# Patient Record
Sex: FEMALE | Race: WHITE | Employment: UNEMPLOYED | URBAN - METROPOLITAN AREA
[De-identification: names, ages, dates, MRNs, and addresses within clinical notes are randomized per-mention and may not be internally consistent; named-entity substitution may affect disease eponyms.]

---
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
## 2023-11-16 ENCOUNTER — OFFICE VISIT (OUTPATIENT)
Dept: URGENT CARE | Age: 23
End: 2023-11-16

## 2023-11-16 VITALS
HEART RATE: 82 BPM | HEIGHT: 62 IN | TEMPERATURE: 99.6 F | WEIGHT: 151 LBS | SYSTOLIC BLOOD PRESSURE: 118 MMHG | BODY MASS INDEX: 27.79 KG/M2 | OXYGEN SATURATION: 96 % | DIASTOLIC BLOOD PRESSURE: 78 MMHG

## 2023-11-16 DIAGNOSIS — R50.9 FEVER AND CHILLS: ICD-10-CM

## 2023-11-16 DIAGNOSIS — J06.9 UPPER RESPIRATORY TRACT INFECTION, UNSPECIFIED TYPE: Primary | ICD-10-CM

## 2023-11-16 DIAGNOSIS — R51.9 HEADACHE AROUND THE EYES: ICD-10-CM

## 2023-11-16 LAB
Lab: NORMAL
QC PASS/FAIL: NORMAL
SARS-COV-2 RDRP RESP QL NAA+PROBE: NEGATIVE

## 2023-11-16 RX ORDER — IBUPROFEN 800 MG/1
800 TABLET ORAL 2 TIMES DAILY PRN
Qty: 60 TABLET | Refills: 0 | Status: SHIPPED | OUTPATIENT
Start: 2023-11-16 | End: 2023-12-16

## 2023-11-16 ASSESSMENT — ENCOUNTER SYMPTOMS
SINUS PRESSURE: 1
CONSTIPATION: 0
DIARRHEA: 0
ABDOMINAL PAIN: 0
WHEEZING: 0
COUGH: 1
SHORTNESS OF BREATH: 0
VOMITING: 0
CHEST TIGHTNESS: 0
NAUSEA: 0
STRIDOR: 0
SORE THROAT: 0

## 2024-04-15 ENCOUNTER — OFFICE VISIT (OUTPATIENT)
Dept: URGENT CARE | Age: 24
End: 2024-04-15

## 2024-04-15 VITALS
OXYGEN SATURATION: 99 % | HEIGHT: 62 IN | TEMPERATURE: 98.7 F | WEIGHT: 162 LBS | DIASTOLIC BLOOD PRESSURE: 80 MMHG | BODY MASS INDEX: 29.81 KG/M2 | SYSTOLIC BLOOD PRESSURE: 127 MMHG | HEART RATE: 77 BPM

## 2024-04-15 DIAGNOSIS — Z32.01 PREGNANCY CONFIRMED BY POSITIVE URINE TEST: ICD-10-CM

## 2024-04-15 DIAGNOSIS — N91.1 SECONDARY AMENORRHEA: Primary | ICD-10-CM

## 2024-04-15 LAB
CONTROL: POSITIVE
PREGNANCY TEST URINE, POC: POSITIVE

## 2024-04-15 ASSESSMENT — ENCOUNTER SYMPTOMS
ABDOMINAL PAIN: 0
SORE THROAT: 0
COUGH: 0
VOMITING: 0
DIARRHEA: 0
NAUSEA: 0

## 2024-04-15 NOTE — PROGRESS NOTES
Cass Bertrand (:  2000) is a 23 y.o. female,Established patient, here for evaluation of the following chief complaint(s):  Pregnancy Test (Pt has missed period and home pregnancy test is positive)      ASSESSMENT/PLAN:    ICD-10-CM    1. Secondary amenorrhea  N91.1 POCT urine pregnancy      2. Pregnancy confirmed by positive urine test  Z32.01         Results for POC orders placed in visit on 04/15/24   POCT urine pregnancy   Result Value Ref Range    Preg Test, Ur POSITIVE (A)     Control POSITIVE       Schedule appointment with OB/gyn for follow up visit     Go to ER if spikes fever, if abdominal/back pain/cramping  Vaginal bleeding or has new symptoms or concerns    SUBJECTIVE/OBJECTIVE:   Few days late with period...The home pregnancy test (+)  no other complaints / no medical issues      History provided by:  Patient   used: No        Vitals:    04/15/24 1121   BP: 127/80   Pulse: 77   Temp: 98.7 °F (37.1 °C)   TempSrc: Oral   SpO2: 99%   Weight: 73.5 kg (162 lb)   Height: 1.575 m (5' 2\")       Review of Systems   Constitutional:  Negative for fever.   HENT:  Negative for sore throat.    Respiratory:  Negative for cough.    Cardiovascular:  Negative for chest pain.   Gastrointestinal:  Negative for abdominal pain, diarrhea, nausea and vomiting.   Genitourinary:  Negative for difficulty urinating, vaginal bleeding and vaginal discharge.   Musculoskeletal:  Negative for myalgias.   Neurological:  Negative for headaches.       Physical Exam    Physical   interested only in pregnancy test  Vitals signs: reviewed  Constitutional:  appearance: well nourished .. No distress                                 .  An electronic signature was used to authenticate this note.    --Aman Limon MD

## 2024-04-21 ENCOUNTER — APPOINTMENT (OUTPATIENT)
Dept: ULTRASOUND IMAGING | Age: 24
End: 2024-04-21
Payer: COMMERCIAL

## 2024-04-21 ENCOUNTER — HOSPITAL ENCOUNTER (EMERGENCY)
Age: 24
Discharge: HOME | End: 2024-04-21
Attending: STUDENT IN AN ORGANIZED HEALTH CARE EDUCATION/TRAINING PROGRAM
Payer: COMMERCIAL

## 2024-04-21 VITALS
DIASTOLIC BLOOD PRESSURE: 81 MMHG | HEIGHT: 62 IN | BODY MASS INDEX: 30.36 KG/M2 | HEART RATE: 71 BPM | OXYGEN SATURATION: 100 % | TEMPERATURE: 97.9 F | WEIGHT: 165 LBS | RESPIRATION RATE: 16 BRPM | SYSTOLIC BLOOD PRESSURE: 131 MMHG

## 2024-04-21 DIAGNOSIS — O46.90 VAGINAL BLEEDING IN PREGNANCY: Primary | ICD-10-CM

## 2024-04-21 DIAGNOSIS — Z67.90 BLOOD TYPE, RH POSITIVE: ICD-10-CM

## 2024-04-21 LAB
ABO + RH BLD: NORMAL
ANION GAP SERPL CALCULATED.3IONS-SCNC: 14 MMOL/L (ref 3–16)
B-HCG SERPL EIA 3RD IS-ACNC: 19.3 MIU/ML
BASOPHILS # BLD: 0 K/UL (ref 0–0.2)
BASOPHILS NFR BLD: 0.3 %
BILIRUB UR QL STRIP.AUTO: NEGATIVE
BUN SERPL-MCNC: 14 MG/DL (ref 7–20)
CALCIUM SERPL-MCNC: 9 MG/DL (ref 8.3–10.6)
CHLORIDE SERPL-SCNC: 98 MMOL/L (ref 99–110)
CLARITY UR: CLEAR
CO2 SERPL-SCNC: 22 MMOL/L (ref 21–32)
COLOR UR: ABNORMAL
CREAT SERPL-MCNC: 0.7 MG/DL (ref 0.6–1.1)
DEPRECATED RDW RBC AUTO: 16.4 % (ref 12.4–15.4)
EOSINOPHIL # BLD: 0.1 K/UL (ref 0–0.6)
EOSINOPHIL NFR BLD: 0.6 %
EPI CELLS #/AREA URNS HPF: NORMAL /HPF (ref 0–5)
GFR SERPLBLD CREATININE-BSD FMLA CKD-EPI: >90 ML/MIN/{1.73_M2}
GLUCOSE SERPL-MCNC: 88 MG/DL (ref 70–99)
GLUCOSE UR STRIP.AUTO-MCNC: NEGATIVE MG/DL
HCG UR QL: NEGATIVE
HCT VFR BLD AUTO: 38.5 % (ref 36–48)
HGB BLD-MCNC: 12.3 G/DL (ref 12–16)
HGB UR QL STRIP.AUTO: ABNORMAL
KETONES UR STRIP.AUTO-MCNC: NEGATIVE MG/DL
LEUKOCYTE ESTERASE UR QL STRIP.AUTO: NEGATIVE
LYMPHOCYTES # BLD: 2.2 K/UL (ref 1–5.1)
LYMPHOCYTES NFR BLD: 22.9 %
MCH RBC QN AUTO: 25.4 PG (ref 26–34)
MCHC RBC AUTO-ENTMCNC: 32 G/DL (ref 31–36)
MCV RBC AUTO: 79.3 FL (ref 80–100)
MONOCYTES # BLD: 0.7 K/UL (ref 0–1.3)
MONOCYTES NFR BLD: 7.6 %
NEUTROPHILS # BLD: 6.6 K/UL (ref 1.7–7.7)
NEUTROPHILS NFR BLD: 68.6 %
NITRITE UR QL STRIP.AUTO: NEGATIVE
PH UR STRIP.AUTO: 6 [PH] (ref 5–8)
PLATELET # BLD AUTO: 251 K/UL (ref 135–450)
PMV BLD AUTO: 8 FL (ref 5–10.5)
POTASSIUM SERPL-SCNC: 3.9 MMOL/L (ref 3.5–5.1)
PROT UR STRIP.AUTO-MCNC: NEGATIVE MG/DL
RBC # BLD AUTO: 4.85 M/UL (ref 4–5.2)
RBC #/AREA URNS HPF: NORMAL /HPF (ref 0–4)
SODIUM SERPL-SCNC: 134 MMOL/L (ref 136–145)
SP GR UR STRIP.AUTO: <=1.005 (ref 1–1.03)
UA COMPLETE W REFLEX CULTURE PNL UR: ABNORMAL
UA DIPSTICK W REFLEX MICRO PNL UR: YES
URN SPEC COLLECT METH UR: ABNORMAL
UROBILINOGEN UR STRIP-ACNC: 0.2 E.U./DL
WBC # BLD AUTO: 9.7 K/UL (ref 4–11)
WBC #/AREA URNS HPF: NORMAL /HPF (ref 0–5)

## 2024-04-21 PROCEDURE — 76817 TRANSVAGINAL US OBSTETRIC: CPT

## 2024-04-21 PROCEDURE — 84702 CHORIONIC GONADOTROPIN TEST: CPT

## 2024-04-21 PROCEDURE — 80048 BASIC METABOLIC PNL TOTAL CA: CPT

## 2024-04-21 PROCEDURE — 76801 OB US < 14 WKS SINGLE FETUS: CPT

## 2024-04-21 PROCEDURE — 86900 BLOOD TYPING SEROLOGIC ABO: CPT

## 2024-04-21 PROCEDURE — 86901 BLOOD TYPING SEROLOGIC RH(D): CPT

## 2024-04-21 PROCEDURE — 81001 URINALYSIS AUTO W/SCOPE: CPT

## 2024-04-21 PROCEDURE — 84703 CHORIONIC GONADOTROPIN ASSAY: CPT

## 2024-04-21 PROCEDURE — 99284 EMERGENCY DEPT VISIT MOD MDM: CPT

## 2024-04-21 PROCEDURE — 85025 COMPLETE CBC W/AUTO DIFF WBC: CPT

## 2024-04-21 ASSESSMENT — PAIN SCALES - GENERAL: PAINLEVEL_OUTOF10: 7

## 2024-04-21 ASSESSMENT — PAIN DESCRIPTION - ORIENTATION: ORIENTATION: RIGHT;LEFT;LOWER

## 2024-04-21 ASSESSMENT — PAIN DESCRIPTION - LOCATION: LOCATION: ABDOMEN

## 2024-04-21 ASSESSMENT — PAIN - FUNCTIONAL ASSESSMENT: PAIN_FUNCTIONAL_ASSESSMENT: 0-10

## 2024-04-21 NOTE — ED PROVIDER NOTES
[facility]  ED  EMERGENCY DEPARTMENT ENCOUNTER        Pt Name: Cass Bertrand  MRN: [id number]  Birthdate 2000  Date of evaluation: 2024  Provider: MINDI MONDRAGON PA-C  PCP: Blanca Chapman  ED Attending: MD Helder       I have seen and evaluated this patient with my supervising physician No att. providers found.    CHIEF COMPLAINT:     Chief Complaint   Patient presents with    Vaginal Bleeding     Patient is approximately 6 weeks pregnant and has been spotting the last couple days with the bleeding increasing today, states like a menstrual cycle. Having lower bilateral quadrant cramping as well.       HISTORY OF PRESENT ILLNESS:      History provided by the patient. No limitations.    Cass Bertrand is a 23 y.o. female who arrives to the ED by private vehicle.  Patient here with a friend.  The patient reports she is pregnant.  She estimates the first day of her LMP was either 3/ or 3/13.  She states for the last week she has been having some pelvic cramping.  She was seen at urgent care on 4/15 and was confirmed to have a positive pregnancy test.  Less than 1 hour prior to arrival the patient noted some vaginal bleeding and because of that, she is here for evaluation.  Bleeding initially was fairly heavy, comparable to her menstrual cycle.  However, she states now she is just \"spotting\" but continues with pelvic cramping. This is the patient's second pregnancy.  OB history  A0.     Nursing Notes were reviewed     REVIEW OF SYSTEMS:     Review of Systems  Positives and pertinent negatives as per HPI.      PAST MEDICAL HISTORY:     Past Medical History:   Diagnosis Date    Depression        SURGICAL HISTORY:      Past Surgical History:   Procedure Laterality Date    WISDOM TOOTH EXTRACTION         CURRENT MEDICATIONS:       Discharge Medication List as of 2024 10:26 PM          ALLERGIES:    Patient has no known allergies.    FAMILY HISTORY:     @[email or web address]    SOCIAL HISTORY:

## 2024-04-22 NOTE — ED PROVIDER NOTES
I independently performed a history and physical on Cass Bertrand.     I have discussed the case with the HEATHER/resident at 2100 and approve / take responsibility for the initial management plan and anticipated disposition as documented below.     In summary the patient presents with vaginal bleeding and abdominal cramping in the context of early pregnancy.  The patient is a  at an estimated 6 weeks of gestation.  She states she has already visited with OB/GYN with a positive pregnancy test.  This is a desired pregnancy.  She states that she had some light pink vaginal discharge over recent days however with development of bleeding today with the addition of cramping which prompted her to seek evaluation in the emergency department.  On my evaluation the patient is alert and conversant and little acute distress.  Currently pain is minimal.  She is afebrile and hemodynamically stable.  Breath sounds are clear abdomen soft nontender nondistended no guarding rigidity or rebound.  No CVA tenderness.  Extremities are warm and well-perfused.  Workup is reviewed bedside she had a negative qualitative test however given her recent positive test a quant was obtained at 19.  Remainder of labs benign.  She is Rh+.  Overall favor this represents a SAB however given that the location of the pregnancy had not been previously characterized and with her vaginal bleeding cramping progressive over days we have proceeded with assessment for pregnancy of unknown location.  We will assess ultrasound imaging and likely discuss with OB/GYN for further management.    I interpreted the following studies:  na    I personally discussed the patient's management with the following:  na         For further details of Cass Bertrand's emergency department encounter, please see the HEATHER/resident's documentation. Please note the signature time recorded here indicates the limit of my supervision of this case and should the patient require further

## 2024-04-22 NOTE — ED NOTES
2200- Called Pt OB/GYN  Per Yosi CAMPOS  Re  + HCG, no IUP on US, bleeding, cramping  2208- Dr. Sanches returned page